# Patient Record
Sex: FEMALE | Race: WHITE | NOT HISPANIC OR LATINO
[De-identification: names, ages, dates, MRNs, and addresses within clinical notes are randomized per-mention and may not be internally consistent; named-entity substitution may affect disease eponyms.]

---

## 2017-12-13 ENCOUNTER — APPOINTMENT (OUTPATIENT)
Dept: GASTROENTEROLOGY | Facility: CLINIC | Age: 62
End: 2017-12-13
Payer: COMMERCIAL

## 2017-12-13 VITALS
HEIGHT: 68 IN | DIASTOLIC BLOOD PRESSURE: 85 MMHG | RESPIRATION RATE: 18 BRPM | WEIGHT: 180 LBS | HEART RATE: 73 BPM | BODY MASS INDEX: 27.28 KG/M2 | SYSTOLIC BLOOD PRESSURE: 138 MMHG

## 2017-12-13 DIAGNOSIS — Z12.11 ENCOUNTER FOR SCREENING FOR MALIGNANT NEOPLASM OF COLON: ICD-10-CM

## 2017-12-13 PROCEDURE — 99202 OFFICE O/P NEW SF 15 MIN: CPT

## 2017-12-13 PROCEDURE — 82272 OCCULT BLD FECES 1-3 TESTS: CPT

## 2017-12-13 RX ORDER — MULTIVITAMIN
TABLET ORAL
Refills: 0 | Status: ACTIVE | COMMUNITY

## 2018-03-16 ENCOUNTER — RESULT REVIEW (OUTPATIENT)
Age: 63
End: 2018-03-16

## 2018-03-16 ENCOUNTER — OUTPATIENT (OUTPATIENT)
Dept: OUTPATIENT SERVICES | Facility: HOSPITAL | Age: 63
LOS: 1 days | End: 2018-03-16
Payer: COMMERCIAL

## 2018-03-16 ENCOUNTER — APPOINTMENT (OUTPATIENT)
Dept: GASTROENTEROLOGY | Facility: GI CENTER | Age: 63
End: 2018-03-16
Payer: COMMERCIAL

## 2018-03-16 DIAGNOSIS — Z80.0 FAMILY HISTORY OF MALIGNANT NEOPLASM OF DIGESTIVE ORGANS: ICD-10-CM

## 2018-03-16 LAB
ALBUMIN SERPL ELPH-MCNC: 4.4 G/DL
ALP BLD-CCNC: 83 U/L
ALT SERPL-CCNC: 29 U/L
ANION GAP SERPL CALC-SCNC: 16 MMOL/L
AST SERPL-CCNC: 23 U/L
BASOPHILS # BLD AUTO: 0.03 K/UL
BASOPHILS NFR BLD AUTO: 0.4 %
BILIRUB SERPL-MCNC: 0.5 MG/DL
BUN SERPL-MCNC: 15 MG/DL
CALCIUM SERPL-MCNC: 10 MG/DL
CHLORIDE SERPL-SCNC: 102 MMOL/L
CO2 SERPL-SCNC: 22 MMOL/L
CREAT SERPL-MCNC: 0.99 MG/DL
EOSINOPHIL # BLD AUTO: 0.14 K/UL
EOSINOPHIL NFR BLD AUTO: 1.7 %
GLUCOSE SERPL-MCNC: 110 MG/DL
HCT VFR BLD CALC: 44.3 %
HGB BLD-MCNC: 14.8 G/DL
IMM GRANULOCYTES NFR BLD AUTO: 0.2 %
LYMPHOCYTES # BLD AUTO: 2.59 K/UL
LYMPHOCYTES NFR BLD AUTO: 32.1 %
MAN DIFF?: NORMAL
MCHC RBC-ENTMCNC: 30.4 PG
MCHC RBC-ENTMCNC: 33.4 GM/DL
MCV RBC AUTO: 91 FL
MONOCYTES # BLD AUTO: 0.75 K/UL
MONOCYTES NFR BLD AUTO: 9.3 %
NEUTROPHILS # BLD AUTO: 4.55 K/UL
NEUTROPHILS NFR BLD AUTO: 56.3 %
PLATELET # BLD AUTO: 298 K/UL
POTASSIUM SERPL-SCNC: 4.4 MMOL/L
PROT SERPL-MCNC: 7.1 G/DL
RBC # BLD: 4.87 M/UL
RBC # FLD: 14.1 %
SODIUM SERPL-SCNC: 140 MMOL/L
WBC # FLD AUTO: 8.08 K/UL

## 2018-03-16 PROCEDURE — 45380 COLONOSCOPY AND BIOPSY: CPT

## 2018-03-16 PROCEDURE — 88305 TISSUE EXAM BY PATHOLOGIST: CPT

## 2018-03-16 PROCEDURE — 45381 COLONOSCOPY SUBMUCOUS NJX: CPT | Mod: PT

## 2018-03-16 PROCEDURE — 88305 TISSUE EXAM BY PATHOLOGIST: CPT | Mod: 26

## 2018-03-16 PROCEDURE — 45380 COLONOSCOPY AND BIOPSY: CPT | Mod: 59,PT

## 2018-03-20 LAB — SURGICAL PATHOLOGY FINAL REPORT - CH: SIGNIFICANT CHANGE UP

## 2018-05-16 ENCOUNTER — TRANSCRIPTION ENCOUNTER (OUTPATIENT)
Age: 63
End: 2018-05-16

## 2018-05-17 ENCOUNTER — APPOINTMENT (OUTPATIENT)
Dept: GASTROENTEROLOGY | Facility: HOSPITAL | Age: 63
End: 2018-05-17

## 2018-05-17 ENCOUNTER — OUTPATIENT (OUTPATIENT)
Dept: OUTPATIENT SERVICES | Facility: HOSPITAL | Age: 63
LOS: 1 days | End: 2018-05-17
Payer: COMMERCIAL

## 2018-05-17 ENCOUNTER — RESULT REVIEW (OUTPATIENT)
Age: 63
End: 2018-05-17

## 2018-05-17 DIAGNOSIS — K63.5 POLYP OF COLON: ICD-10-CM

## 2018-05-17 PROCEDURE — 45385 COLONOSCOPY W/LESION REMOVAL: CPT

## 2018-05-17 PROCEDURE — 88305 TISSUE EXAM BY PATHOLOGIST: CPT | Mod: 26

## 2018-05-17 PROCEDURE — 45380 COLONOSCOPY AND BIOPSY: CPT | Mod: XS

## 2018-05-17 PROCEDURE — 45381 COLONOSCOPY SUBMUCOUS NJX: CPT

## 2018-05-17 PROCEDURE — C1889: CPT

## 2018-05-25 DIAGNOSIS — K63.5 POLYP OF COLON: ICD-10-CM

## 2018-05-25 LAB — SURGICAL PATHOLOGY FINAL REPORT - CH: SIGNIFICANT CHANGE UP

## 2019-11-09 ENCOUNTER — RESULT REVIEW (OUTPATIENT)
Age: 64
End: 2019-11-09

## 2020-05-26 ENCOUNTER — RESULT REVIEW (OUTPATIENT)
Age: 65
End: 2020-05-26

## 2020-11-10 ENCOUNTER — RESULT REVIEW (OUTPATIENT)
Age: 65
End: 2020-11-10

## 2021-11-03 ENCOUNTER — NON-APPOINTMENT (OUTPATIENT)
Age: 66
End: 2021-11-03

## 2021-11-23 ENCOUNTER — RESULT REVIEW (OUTPATIENT)
Age: 66
End: 2021-11-23

## 2022-02-17 ENCOUNTER — APPOINTMENT (OUTPATIENT)
Dept: FAMILY MEDICINE | Facility: CLINIC | Age: 67
End: 2022-02-17
Payer: COMMERCIAL

## 2022-02-17 VITALS
DIASTOLIC BLOOD PRESSURE: 78 MMHG | SYSTOLIC BLOOD PRESSURE: 128 MMHG | HEIGHT: 68 IN | HEART RATE: 80 BPM | BODY MASS INDEX: 27.43 KG/M2 | WEIGHT: 181 LBS | RESPIRATION RATE: 16 BRPM | TEMPERATURE: 97.7 F | OXYGEN SATURATION: 98 %

## 2022-02-17 DIAGNOSIS — R79.89 OTHER SPECIFIED ABNORMAL FINDINGS OF BLOOD CHEMISTRY: ICD-10-CM

## 2022-02-17 DIAGNOSIS — Z78.9 OTHER SPECIFIED HEALTH STATUS: ICD-10-CM

## 2022-02-17 DIAGNOSIS — Z82.49 FAMILY HISTORY OF ISCHEMIC HEART DISEASE AND OTHER DISEASES OF THE CIRCULATORY SYSTEM: ICD-10-CM

## 2022-02-17 DIAGNOSIS — Z11.52 ENCOUNTER FOR SCREENING FOR COVID-19: ICD-10-CM

## 2022-02-17 DIAGNOSIS — E66.3 OVERWEIGHT: ICD-10-CM

## 2022-02-17 DIAGNOSIS — H91.91 UNSPECIFIED HEARING LOSS, RIGHT EAR: ICD-10-CM

## 2022-02-17 DIAGNOSIS — R73.03 PREDIABETES.: ICD-10-CM

## 2022-02-17 DIAGNOSIS — Z83.3 FAMILY HISTORY OF DIABETES MELLITUS: ICD-10-CM

## 2022-02-17 DIAGNOSIS — E78.5 HYPERLIPIDEMIA, UNSPECIFIED: ICD-10-CM

## 2022-02-17 DIAGNOSIS — R53.83 OTHER FATIGUE: ICD-10-CM

## 2022-02-17 DIAGNOSIS — Z00.00 ENCOUNTER FOR GENERAL ADULT MEDICAL EXAMINATION W/OUT ABNORMAL FINDINGS: ICD-10-CM

## 2022-02-17 DIAGNOSIS — Z84.89 FAMILY HISTORY OF OTHER SPECIFIED CONDITIONS: ICD-10-CM

## 2022-02-17 DIAGNOSIS — F17.200 NICOTINE DEPENDENCE, UNSPECIFIED, UNCOMPLICATED: ICD-10-CM

## 2022-02-17 DIAGNOSIS — Z82.61 FAMILY HISTORY OF ARTHRITIS: ICD-10-CM

## 2022-02-17 PROCEDURE — 99387 INIT PM E/M NEW PAT 65+ YRS: CPT

## 2022-06-17 ENCOUNTER — APPOINTMENT (OUTPATIENT)
Dept: DERMATOLOGY | Facility: CLINIC | Age: 67
End: 2022-06-17
Payer: COMMERCIAL

## 2022-06-17 PROCEDURE — 99203 OFFICE O/P NEW LOW 30 MIN: CPT

## 2022-07-01 ENCOUNTER — APPOINTMENT (OUTPATIENT)
Dept: GASTROENTEROLOGY | Facility: CLINIC | Age: 67
End: 2022-07-01

## 2022-07-01 ENCOUNTER — NON-APPOINTMENT (OUTPATIENT)
Age: 67
End: 2022-07-01

## 2022-07-01 VITALS
BODY MASS INDEX: 25.76 KG/M2 | RESPIRATION RATE: 14 BRPM | HEIGHT: 68 IN | OXYGEN SATURATION: 98 % | SYSTOLIC BLOOD PRESSURE: 116 MMHG | WEIGHT: 170 LBS | HEART RATE: 72 BPM | DIASTOLIC BLOOD PRESSURE: 82 MMHG

## 2022-07-01 PROCEDURE — 99203 OFFICE O/P NEW LOW 30 MIN: CPT

## 2022-07-01 RX ORDER — CIPROFLOXACIN HYDROCHLORIDE 500 MG/1
500 TABLET, FILM COATED ORAL
Qty: 20 | Refills: 0 | Status: DISCONTINUED | COMMUNITY
Start: 2018-01-19 | End: 2022-07-01

## 2022-07-01 RX ORDER — AMOXICILLIN 500 MG/1
500 CAPSULE ORAL
Qty: 30 | Refills: 0 | Status: DISCONTINUED | COMMUNITY
Start: 2017-12-09 | End: 2022-07-01

## 2022-07-01 RX ORDER — MUPIROCIN 20 MG/G
2 OINTMENT TOPICAL
Qty: 22 | Refills: 0 | Status: DISCONTINUED | COMMUNITY
Start: 2018-01-23 | End: 2022-07-01

## 2022-07-01 NOTE — HISTORY OF PRESENT ILLNESS
[de-identified] : The patient has history of high cholesterol.  She has no constipation diarrhea black stools bloody stools abdominal pain change in bowel habits.  She has no family history of colon cancer or colon polyps.\par    Patient had a history of colon polyp in 2018.  She had a 3.5 cm pedunculated polyp that was a tubulovillous adenoma with high-grade dysplasia.  She also had a tubular adenoma polyp.

## 2022-07-01 NOTE — ASSESSMENT
[FreeTextEntry1] : A/P\par  hx of colon polyps  2018- 3.5 cm tubulovillous adenoma with high grade dysplasia\par  I discussed the risks and benefits of colonoscopy and patient was given opportunity to ask questions. Colonoscopy to r/o colon cancer, polyps, AVM's. Patient is medically optimized for the procedure\par \par miralax prep \par \par cbc, cmp requested from Dr Pearson

## 2022-07-01 NOTE — PHYSICAL EXAM
[General Appearance - Alert] : alert [General Appearance - In No Acute Distress] : in no acute distress [General Appearance - Well Nourished] : well nourished [General Appearance - Well Developed] : well developed [Sclera] : the sclera and conjunctiva were normal [] : no respiratory distress [Respiration, Rhythm And Depth] : normal respiratory rhythm and effort [Exaggerated Use Of Accessory Muscles For Inspiration] : no accessory muscle use [Auscultation Breath Sounds / Voice Sounds] : lungs were clear to auscultation bilaterally [Apical Impulse] : the apical impulse was normal [Heart Rate And Rhythm] : heart rate was normal and rhythm regular [Bowel Sounds] : normal bowel sounds [Heart Sounds] : normal S1 and S2 [Abdomen Soft] : soft [Abdomen Tenderness] : non-tender [Normal Sphincter Tone] : normal sphincter tone [No Rectal Mass] : no rectal mass [Internal Hemorrhoid] : no internal hemorrhoids [External Hemorrhoid] : no external hemorrhoids [Occult Blood Positive] : stool was negative for occult blood [Oriented To Time, Place, And Person] : oriented to person, place, and time [Impaired Insight] : insight and judgment were intact [Affect] : the affect was normal

## 2022-08-31 ENCOUNTER — TRANSCRIPTION ENCOUNTER (OUTPATIENT)
Age: 67
End: 2022-08-31

## 2022-09-01 ENCOUNTER — APPOINTMENT (OUTPATIENT)
Dept: GASTROENTEROLOGY | Facility: GI CENTER | Age: 67
End: 2022-09-01

## 2022-09-01 ENCOUNTER — RESULT REVIEW (OUTPATIENT)
Age: 67
End: 2022-09-01

## 2022-09-01 ENCOUNTER — OUTPATIENT (OUTPATIENT)
Dept: OUTPATIENT SERVICES | Facility: HOSPITAL | Age: 67
LOS: 1 days | End: 2022-09-01
Payer: COMMERCIAL

## 2022-09-01 DIAGNOSIS — K63.5 POLYP OF COLON: ICD-10-CM

## 2022-09-01 PROCEDURE — 45380 COLONOSCOPY AND BIOPSY: CPT | Mod: PT

## 2022-09-01 PROCEDURE — 88305 TISSUE EXAM BY PATHOLOGIST: CPT

## 2022-09-01 PROCEDURE — 88305 TISSUE EXAM BY PATHOLOGIST: CPT | Mod: 26

## 2022-09-01 PROCEDURE — 45380 COLONOSCOPY AND BIOPSY: CPT

## 2022-09-01 NOTE — PHYSICAL EXAM
[General Appearance - Alert] : alert [General Appearance - In No Acute Distress] : in no acute distress [General Appearance - Well Nourished] : well nourished [General Appearance - Well Developed] : well developed [] : no respiratory distress [Respiration, Rhythm And Depth] : normal respiratory rhythm and effort [Exaggerated Use Of Accessory Muscles For Inspiration] : no accessory muscle use [Auscultation Breath Sounds / Voice Sounds] : lungs were clear to auscultation bilaterally [Apical Impulse] : the apical impulse was normal [Heart Rate And Rhythm] : heart rate was normal and rhythm regular [Heart Sounds] : normal S1 and S2 [Bowel Sounds] : normal bowel sounds [Abdomen Soft] : soft [Abdomen Tenderness] : non-tender [Oriented To Time, Place, And Person] : oriented to person, place, and time [Impaired Insight] : insight and judgment were intact [Affect] : the affect was normal

## 2022-09-01 NOTE — ASSESSMENT
[FreeTextEntry1] : A/P hx of colon polyp\par  I discussed the risks and benefits of colonoscopy and patient was given opportunity to ask questions. Colonoscopy to r/o colon cancer, polyps, AVM's. Patient is medically optimized for the procedure\par

## 2022-09-06 LAB — SURGICAL PATHOLOGY STUDY: SIGNIFICANT CHANGE UP

## 2022-10-21 ENCOUNTER — APPOINTMENT (OUTPATIENT)
Dept: DERMATOLOGY | Facility: CLINIC | Age: 67
End: 2022-10-21

## 2024-11-14 ENCOUNTER — APPOINTMENT (OUTPATIENT)
Dept: ORTHOPEDIC SURGERY | Facility: CLINIC | Age: 69
End: 2024-11-14
Payer: COMMERCIAL

## 2024-11-14 DIAGNOSIS — M25.562 PAIN IN LEFT KNEE: ICD-10-CM

## 2024-11-14 DIAGNOSIS — G89.29 PAIN IN LEFT KNEE: ICD-10-CM

## 2024-11-14 PROCEDURE — 73564 X-RAY EXAM KNEE 4 OR MORE: CPT | Mod: LT

## 2024-11-14 PROCEDURE — 99204 OFFICE O/P NEW MOD 45 MIN: CPT

## 2024-11-14 RX ORDER — ASPIRIN 81 MG/1
81 TABLET ORAL
Refills: 0 | Status: ACTIVE | COMMUNITY

## 2024-11-14 NOTE — DISCUSSION/SUMMARY
[de-identified] : The patient was advised of the diagnosis. The natural history of the pathology was explained in full to the patient in layman's terms. Several different treatment options were discussed and explained in full to the patient including the risks and benefits of both surgical and non-surgical treatments. All questions and concerns were answered.   Lengthy discussion regarding options was had with the patient. Nonsurgical options including but not limited to cortisone, Visco supplementation, Prescription anti-inflammatory medications (both steroidal and non-steroidal), activity modification, non-impact exercise, maintaining a healthy BMI, bracing, and icing were reviewed. Surgical options including but not limited to arthroscopy, and joint replacement were discussed as was risks, benefits and alternatives of all the proposed treatments. Discussed also with the patient that they could also delay any immediate treatment options, and continue to observe and self care for the discussed problem. Discussed HEP as well as Rest, Ice and elevation. Patient had ample time to ask any questions about todays visit and the diagnosis, and all questions were answered. Patient understands the plan going forward.  Patient was given Rx for MRI of the LEFT KNEE to further evaluate for ligamentous, muscle and/or cartilaginous injury that is suspected due to physical examination. Patient also reports history of clicking/popping sensations, decreased ROM and strength, and a feeling of instability associated with this body part. Patient was instructed to f/u after imaging for review.

## 2024-11-14 NOTE — PHYSICAL EXAM
[5___] : hamstring 5[unfilled]/5 [Negative] : negative Jennifer's [] : patient ambulates without assistive device [Left] : left knee [AP] : anteroposterior [Lateral] : lateral [[place]] : skyline [AP Standing] : anteroposterior standing [de-identified] : Click audible with MCM [FreeTextEntry9] : Medial joint space narrowing. Sclerosis of the medial knee. Varus deformity. Severe DJD medial compartment. SMALL osteophyte formation. No fractures seen. Hardware present from previous vascular surgery [TWNoteComboBox7] : flexion 125 degrees [de-identified] : extension 0 degrees

## 2024-11-14 NOTE — HISTORY OF PRESENT ILLNESS
[de-identified] : Date of Injury/Onset: 3440-8320 Mechanism of injury: NKI Have you been treated for this in the past? N Have you had surgery for this in the past?N Physical Therapy/ HEP: None OTC Medicines: motrin, ibuparofen RX medicines: Heat, Ice, Elevation: ICE CSI or Gel Injections:  None Other Previous Treatment: KNEE BRACE Prior Imaging/Studies:  NONE   Pain: At Rest: 2/10 With Activity: 10/10 -  with pain episodes Quality of symptoms:C/O PAIN POSTERIOR, BAKERS CYST, SWELLING, INSTABILITY   Affecting Sleep: Yes Stiffness upon waking, lasting greater than 30mins: Yes Difficulty with stairs: Yes Difficulty getting in and out of car: Yes Sit to stand stiffness: Yes Affects walking short/long distances? Yes Home/Work/Recreation affected? Yes   Improves with: MOTRIN Worse with: twisting   This is not a Work-Related Injury being treated under Worker's Compensation. This is not an athletic injury occurring associated with an interscholastic or organized sports team.

## 2024-11-29 ENCOUNTER — RESULT REVIEW (OUTPATIENT)
Age: 69
End: 2024-11-29

## 2025-01-09 ENCOUNTER — APPOINTMENT (OUTPATIENT)
Dept: ORTHOPEDIC SURGERY | Facility: CLINIC | Age: 70
End: 2025-01-09
Payer: COMMERCIAL

## 2025-01-09 VITALS — HEIGHT: 68 IN | WEIGHT: 170 LBS | BODY MASS INDEX: 25.76 KG/M2

## 2025-01-09 DIAGNOSIS — G89.29 PAIN IN LEFT KNEE: ICD-10-CM

## 2025-01-09 DIAGNOSIS — M25.562 PAIN IN LEFT KNEE: ICD-10-CM

## 2025-01-09 DIAGNOSIS — S83.262A PERIPHERAL TEAR OF LATERAL MENISCUS, CURRENT INJURY, LEFT KNEE, INITIAL ENCOUNTER: ICD-10-CM

## 2025-01-09 PROCEDURE — 99214 OFFICE O/P EST MOD 30 MIN: CPT

## 2025-01-09 NOTE — PHYSICAL EXAM
[5___] : hamstring 5[unfilled]/5 [Negative] : negative Jennifer's [] : patient ambulates without assistive device [Left] : left knee [AP] : anteroposterior [Lateral] : lateral [[place]] : skyline [AP Standing] : anteroposterior standing [de-identified] : Click audible with MCM [FreeTextEntry9] : Medial joint space narrowing. Sclerosis of the medial knee. Varus deformity. Severe DJD medial compartment. SMALL osteophyte formation. No fractures seen. Hardware present from previous vascular surgery [TWNoteComboBox7] : flexion 125 degrees [de-identified] : extension 0 degrees

## 2025-01-09 NOTE — DATA REVIEWED
[MRI] : MRI [Left] : left [Knee] : knee [Report was reviewed and noted in the chart] : The report was reviewed and noted in the chart [I independently reviewed and interpreted images and report] : I independently reviewed and interpreted images and report [FreeTextEntry1] : Horizontal tear throughout lateral meniscus. Radial tear of the free edge/inner margin of the lateral meniscal body and posterior horn/body junction of lateral meniscus with mild peripheral extrusion of meniscal tissue  Moderate-severe patellofemoral joint DJD.  Moderate knee joint effusion

## 2025-01-09 NOTE — REASON FOR VISIT
[FreeTextEntry2] : here for mri results left knee.  patient states knee is doing well at this time.

## 2025-01-09 NOTE — DISCUSSION/SUMMARY
[de-identified] :  Feeling no pain today despite Lateral meniscus tear Dx ,   Will call if pain increases  Continue home strengthening and stretching program consisting of non-impact exercises and ice as needed. Return to office PRN.

## 2025-04-11 ENCOUNTER — APPOINTMENT (OUTPATIENT)
Dept: GASTROENTEROLOGY | Facility: CLINIC | Age: 70
End: 2025-04-11
Payer: COMMERCIAL

## 2025-04-11 VITALS
DIASTOLIC BLOOD PRESSURE: 100 MMHG | HEART RATE: 96 BPM | BODY MASS INDEX: 32.28 KG/M2 | SYSTOLIC BLOOD PRESSURE: 148 MMHG | HEIGHT: 68 IN | OXYGEN SATURATION: 98 % | RESPIRATION RATE: 14 BRPM | WEIGHT: 213 LBS | TEMPERATURE: 97.2 F

## 2025-04-11 PROCEDURE — 99213 OFFICE O/P EST LOW 20 MIN: CPT

## 2025-04-11 RX ORDER — SEMAGLUTIDE 0.25 MG/.5ML
0.25 INJECTION, SOLUTION SUBCUTANEOUS
Refills: 0 | Status: ACTIVE | COMMUNITY

## 2025-04-11 RX ORDER — AZELASTINE HYDROCHLORIDE AND FLUTICASONE PROPIONATE 137; 50 UG/1; UG/1
137-50 SPRAY, METERED NASAL
Refills: 0 | Status: ACTIVE | COMMUNITY

## 2025-04-11 RX ORDER — ROSUVASTATIN CALCIUM 5 MG/1
5 TABLET, FILM COATED ORAL
Refills: 0 | Status: ACTIVE | COMMUNITY

## 2025-04-11 NOTE — ASSESSMENT
[FreeTextEntry1] : A/P Patient with history of colon polyp Due for surveillance colonoscopy  I discussed the risks and benefits of colonoscopy and patient was given opportunity to ask questions. Colonoscopy to r/o colon cancer, polyps, AVM's. Patient is medically optimized for the procedure MiraLAX prep CBC CMP from primary MD Dr. Parada

## 2025-04-11 NOTE — HISTORY OF PRESENT ILLNESS
[FreeTextEntry1] : Patient with a history of high cholesterol.  Recently noted to have increasing hemoglobin A1c (6.1) she gained 40 pounds after smoking cessation.  Was placed on Wegovy.  Plan is to 4Y Govil for 3 months as per patient.  She is denying chronic constipation diarrhea black stools or bloody stools.  No abdominal pain.  History of pedunculated polyp 3.5 cm tubulovillous adenoma in 2018 as well as a small tubular adenoma.  2022 she was noted to have a tubular adenoma polyp, diverticulosis and internal hemorrhoids.  She denies any constipation diarrhea black stools or bloody stool

## 2025-05-05 ENCOUNTER — APPOINTMENT (OUTPATIENT)
Dept: ORTHOPEDIC SURGERY | Facility: CLINIC | Age: 70
End: 2025-05-05
Payer: COMMERCIAL

## 2025-05-05 VITALS — BODY MASS INDEX: 32.28 KG/M2 | WEIGHT: 213 LBS | HEIGHT: 68 IN

## 2025-05-05 DIAGNOSIS — M25.561 PAIN IN RIGHT KNEE: ICD-10-CM

## 2025-05-05 PROCEDURE — 99214 OFFICE O/P EST MOD 30 MIN: CPT

## 2025-05-05 NOTE — HISTORY OF PRESENT ILLNESS
[de-identified] : Date of Injury/Onset: 8 WEEKS Mechanism of injury: 3/22/25 fell in bathroom and felt something in knee Have you been treated for this in the past?N Have you had surgery for this in the past?N Physical Therapy/ HEP: None OTC Medicines: MOTRIN RX medicines: Heat, Ice, Elevation: ICE CSI or Gel Injections:  None Other Previous Treatment: KNEE SLEEVE Prior Imaging/Studies:  ZP   Pain: At Rest: 0/10 With Activity: 7/10 Quality of symptoms: C/O MEDIAL SIDED PAIN, SWELLING, LIMPING WITH AMBULATION   Affecting Sleep: Yes Stiffness upon waking, lasting greater than 30mins: Yes Difficulty with stairs: Yes Difficulty getting in and out of car: Yes Sit to stand stiffness: Yes Affects walking short/long distances? Yes Home/Work/Recreation affected? Yes   Improves with:REST Worse with: SIT TO STAND   This is not a Work-Related Injury being treated under Worker's Compensation. This is not an athletic injury occurring associated with an interscholastic or organized sports team.

## 2025-05-05 NOTE — DISCUSSION/SUMMARY
[de-identified] : The patient was advised of the diagnosis. The natural history of the pathology was explained in full to the patient in layman's terms. Several different treatment options were discussed and explained in full to the patient including the risks and benefits of both surgical and non-surgical treatments. All questions and concerns were answered.  Patient was given Rx for MRI of the RIGHT KNEE    to further evaluate for ligamentous, muscle and/or cartilaginous injury that is suspected due to physical examination. Patient also reports history of clicking/popping sensations, decreased ROM and strength, and a feeling of instability associated with this body part. Patient was instructed to f/u after imaging for review.    Physical Therapy Rx 's Provided. .  F/I AFTER MRI

## 2025-05-05 NOTE — PHYSICAL EXAM
[5___] : hamstring 5[unfilled]/5 [Positive] : positive Connor [] : patient ambulates without assistive device [Right] : right knee [All Views] : anteroposterior, lateral, skyline, and anteroposterior standing [Outside films reviewed] : Outside films reviewed [There are no fractures, subluxations or dislocations. No significant abnormalities are seen] : There are no fractures, subluxations or dislocations. No significant abnormalities are seen [FreeTextEntry9] : There is a prominent enthesophyte in the upper pole of the patella at the quadriceps tendon insertion.  There is calcification of the proximal end of the medial collateral ligament.  Otherwise unremarkable.  [TWNoteComboBox7] : flexion 120 degrees [de-identified] : extension 0 degrees

## 2025-05-07 RX ORDER — MELOXICAM 15 MG/1
15 TABLET ORAL DAILY
Qty: 30 | Refills: 0 | Status: ACTIVE | COMMUNITY
Start: 2025-05-07 | End: 1900-01-01

## 2025-06-20 ENCOUNTER — RESULT REVIEW (OUTPATIENT)
Age: 70
End: 2025-06-20

## (undated) DEVICE — VALVE ENDOSCOPE DEFENDO SINGLE USE

## (undated) DEVICE — FORCEP ENDOJAW AGTR LC W NDL 2.8MM 230CM DISP